# Patient Record
Sex: MALE | Race: WHITE
[De-identification: names, ages, dates, MRNs, and addresses within clinical notes are randomized per-mention and may not be internally consistent; named-entity substitution may affect disease eponyms.]

---

## 2018-08-22 ENCOUNTER — HOSPITAL ENCOUNTER (OUTPATIENT)
Dept: HOSPITAL 47 - LABWHC1 | Age: 38
Discharge: HOME | End: 2018-08-22
Payer: COMMERCIAL

## 2018-08-22 DIAGNOSIS — E11.65: Primary | ICD-10-CM

## 2018-08-22 PROCEDURE — 84681 ASSAY OF C-PEPTIDE: CPT

## 2018-08-22 PROCEDURE — 36415 COLL VENOUS BLD VENIPUNCTURE: CPT

## 2018-08-22 PROCEDURE — 82947 ASSAY GLUCOSE BLOOD QUANT: CPT

## 2018-09-17 ENCOUNTER — HOSPITAL ENCOUNTER (OUTPATIENT)
Dept: HOSPITAL 47 - RADECHMAIN | Age: 38
End: 2018-09-17
Attending: FAMILY MEDICINE
Payer: COMMERCIAL

## 2018-09-17 DIAGNOSIS — I08.1: Primary | ICD-10-CM

## 2018-09-17 DIAGNOSIS — R07.9: ICD-10-CM

## 2018-09-17 PROCEDURE — 93017 CV STRESS TEST TRACING ONLY: CPT

## 2018-09-17 PROCEDURE — 93306 TTE W/DOPPLER COMPLETE: CPT

## 2018-09-17 NOTE — EST
EXERCISE STRESS



AGE:

38



SEX:

M



HT:

6'



WT:

205



PROTOCOL:

Jonathan Stress Test



STAGE:

3



HEART RATE REST:

107



BLOOD PRESSURE REST:

108/73



MAXIMUM HEART RATE ACHIEVED:

126



MAXIMUM BLOOD PRESSURE:

201/81



85% MPHR:

154



100% MPHR:

181



METS:

12.3



CLINICAL INFORMATION:

Patient was exercised for a total period of 12 minutes.  A peak heart rate of 126 was

achieved. Maximum blood pressure of 201/81 mmHg was noted.  Patient did not complain of

any chest pain during the test.



Resting EKG shows a normal sinus rhythm with normal [place] interval and QRS duration and

normal ST-T waves.  No ST-segment depression suggestive of ischemia is noted.  No

dysrhythmias are noted.



FINAL IMPRESSION:

1. This exercise test is not suggestive of ischemia.

2. Patient's exercise tolerance is normal.

3. Patient did not complain of any chest pain during the test.





MMODL / IJN: [id number] / Job#: [id number]

## 2018-09-18 NOTE — ECHOF
Referral Reason:[id number] Dizziness



MEASUREMENTS

--------

HEIGHT: 182.9 cm

WEIGHT: 91.6 kg

BP: 

RVIDd:   2.8 cm     (< 3.3)

IVSd:   0.9 cm     (0.6 - 1.1)

LVIDd:   4.4 cm     (3.9 - 5.3)

LVPWd:   1.1 cm     (0.6 - 1.1)

IVSs:   1.3 cm

LVIDs:   3.4 cm

LVPWs:   1.2 cm

LA Diam:   2.5 cm     (2.7 - 3.8)

LAESV Index (A-L):   18.04 ml/m

Ao Diam:   3.6 cm     (2.0 - 3.7)

AV Cusp:   2.2 cm     (1.5 - 2.6)

LA Diam:   3.2 cm     (2.7 - 3.8)

MV EXCURSION:   21.866 mm     (> 18.000)

MV EF SLOPE:   122 mm/s     (70 - 150)

EPSS:   0.4 cm

MV E Maurice:   0.62 m/s

MV DecT:   207 ms

MV A Maurice:   0.71 m/s

MV E/A Ratio:   0.87 

RAP:   5.00 mmHg

RVSP:   20.18 mmHg







FINDINGS

--------

Sinus rhythm.

This was a technically good study.

LV size, wall thickness and systolic function are normal, with an EF greater than 55%.   The left mago
tricular size is normal.

The right ventricle is normal in size.

The left atrial size is normal.

The right atrial size is normal.

The aortic valve is trileaflet, and appears structurally normal. No aortic stenosis or regurgitation.


Mild mitral regurgitation is present.

Mild tricuspid regurgitation present.   There is no evidence of pulmonary hypertension.   The right v
entricular systolic pressure, as measured by Doppler, is 20.18mmHg.

Trace/mild (physiologic)  pulmonic regurgitation.

The aortic root size is normal.

There is no pericardial effusion.



CONCLUSIONS

--------

1. LV size, wall thickness and systolic function are normal, with an EF greater than 55%.

2. The left ventricular size is normal.

3. The right ventricle is normal in size.

4. The left atrial size is normal.

5. The right atrial size is normal.

6. The aortic valve is trileaflet, and appears structurally normal. No aortic stenosis or regurgitati
on.

7. Mild mitral regurgitation is present.

8. Mild tricuspid regurgitation present.

9. There is no evidence of pulmonary hypertension.

10. The right ventricular systolic pressure, as measured by Doppler, is 20.18mmHg.

11. Trace/mild (physiologic)  pulmonic regurgitation.

12. The aortic root size is normal.

13. There is no pericardial effusion.





SONOGRAPHER: Mindy Wilde RDCS

## 2018-12-17 ENCOUNTER — HOSPITAL ENCOUNTER (OUTPATIENT)
Dept: HOSPITAL 47 - LABWHC1 | Age: 38
Discharge: HOME | End: 2018-12-17
Payer: COMMERCIAL

## 2018-12-17 DIAGNOSIS — E10.65: Primary | ICD-10-CM

## 2018-12-17 LAB
ALBUMIN SERPL-MCNC: 4.4 G/DL (ref 3.8–4.9)
ALBUMIN/GLOB SERPL: 2.93 G/DL (ref 1.2–2.1)
ALP SERPL-CCNC: 65 U/L (ref 41–126)
ALT SERPL-CCNC: 36 U/L (ref 10–49)
ANION GAP SERPL CALC-SCNC: 6.5 MMOL/L (ref 4–12)
AST SERPL-CCNC: 28 U/L (ref 14–35)
BUN SERPL-SCNC: 12 MG/DL (ref 9–27)
CALCIUM SPEC-MCNC: 9.2 MG/DL (ref 8.7–10.3)
CHLORIDE SERPL-SCNC: 104 MMOL/L (ref 96–109)
CHOLEST SERPL-MCNC: 183 MG/DL (ref 0–200)
CO2 SERPL-SCNC: 27.5 MMOL/L (ref 21.6–31.8)
GLOBULIN SER CALC-MCNC: 1.5 G/DL (ref 2.1–3.7)
GLUCOSE SERPL-MCNC: 159 MG/DL (ref 70–110)
HBA1C MFR BLD: 8.8 % (ref 4–6)
HDLC SERPL-MCNC: 45 MG/DL (ref 40–60)
LDLC SERPL CALC-MCNC: 125.4 MG/DL (ref 0–131)
POTASSIUM SERPL-SCNC: 4.3 MMOL/L (ref 3.5–5.5)
PROT SERPL-MCNC: 5.9 G/DL (ref 6.2–8.2)
SODIUM SERPL-SCNC: 138 MMOL/L (ref 135–145)
TRIGL SERPL-MCNC: 63 MG/DL (ref 0–149)
VLDLC SERPL CALC-MCNC: 12.6 MG/DL (ref 5–40)

## 2018-12-17 PROCEDURE — 36415 COLL VENOUS BLD VENIPUNCTURE: CPT

## 2018-12-17 PROCEDURE — 80061 LIPID PANEL: CPT

## 2018-12-17 PROCEDURE — 83036 HEMOGLOBIN GLYCOSYLATED A1C: CPT

## 2018-12-17 PROCEDURE — 84443 ASSAY THYROID STIM HORMONE: CPT

## 2018-12-17 PROCEDURE — 80053 COMPREHEN METABOLIC PANEL: CPT

## 2019-01-23 ENCOUNTER — HOSPITAL ENCOUNTER (OUTPATIENT)
Dept: HOSPITAL 47 - RADXRMAIN | Age: 39
Discharge: HOME | End: 2019-01-23
Attending: FAMILY MEDICINE
Payer: COMMERCIAL

## 2019-01-23 DIAGNOSIS — M17.11: Primary | ICD-10-CM

## 2019-01-23 NOTE — XR
Right knee

 

HISTORY: Swelling and pain

 

3 views of the right knee

 

No evident joint effusion. Mild spurring present at the patellofemoral joint and medial compartment. 
Bone mineralization, joint spaces and alignment are maintained.

 

IMPRESSION: Mild osteoarthritis.

## 2021-09-01 ENCOUNTER — HOSPITAL ENCOUNTER (OUTPATIENT)
Dept: HOSPITAL 47 - RADXRMAIN | Age: 41
Discharge: HOME | End: 2021-09-01
Attending: FAMILY MEDICINE
Payer: COMMERCIAL

## 2021-09-01 DIAGNOSIS — S62.511A: Primary | ICD-10-CM

## 2021-09-01 NOTE — XR
Right thumb

 

HISTORY: Trauma and pain

 

4 views of the right thumb

 

Correlation to right hand dated 6/8/2010

 

At the volar aspect of the interphalangeal joint there is an ossific density present which was not se
en on previous exam measuring approximately 2 mm indeterminate. Alignment interphalangeal joint, meta
tarsophalangeal joint is maintained. There is some irregularity present at the proximal aspect of the
 proximal phalanx, displaced fracture fragment is present measuring 5 mm.

 

IMPRESSION: Gamekeeper's thumb, fracture as described.

## 2021-09-03 ENCOUNTER — HOSPITAL ENCOUNTER (OUTPATIENT)
Dept: HOSPITAL 47 - LABPAT | Age: 41
Discharge: HOME | End: 2021-09-03
Attending: ORTHOPAEDIC SURGERY
Payer: COMMERCIAL

## 2021-09-03 DIAGNOSIS — R94.31: Primary | ICD-10-CM

## 2021-09-03 LAB
ANION GAP SERPL CALC-SCNC: 7 MMOL/L
BASOPHILS # BLD AUTO: 0 K/UL (ref 0–0.2)
BASOPHILS NFR BLD AUTO: 1 %
CHLORIDE SERPL-SCNC: 100 MMOL/L (ref 98–107)
CO2 SERPL-SCNC: 27 MMOL/L (ref 22–30)
EOSINOPHIL # BLD AUTO: 0.1 K/UL (ref 0–0.7)
EOSINOPHIL NFR BLD AUTO: 3 %
ERYTHROCYTE [DISTWIDTH] IN BLOOD BY AUTOMATED COUNT: 4.98 M/UL (ref 4.3–5.9)
ERYTHROCYTE [DISTWIDTH] IN BLOOD: 12.9 % (ref 11.5–15.5)
GLUCOSE SERPL-MCNC: 378 MG/DL (ref 74–99)
HBA1C MFR BLD: 10.2 % (ref 4–6)
HCT VFR BLD AUTO: 47 % (ref 39–53)
HGB BLD-MCNC: 16 GM/DL (ref 13–17.5)
LYMPHOCYTES # SPEC AUTO: 1.4 K/UL (ref 1–4.8)
LYMPHOCYTES NFR SPEC AUTO: 32 %
MCH RBC QN AUTO: 32.1 PG (ref 25–35)
MCHC RBC AUTO-ENTMCNC: 34 G/DL (ref 31–37)
MCV RBC AUTO: 94.3 FL (ref 80–100)
MONOCYTES # BLD AUTO: 0.2 K/UL (ref 0–1)
MONOCYTES NFR BLD AUTO: 5 %
NEUTROPHILS # BLD AUTO: 2.6 K/UL (ref 1.3–7.7)
NEUTROPHILS NFR BLD AUTO: 58 %
PLATELET # BLD AUTO: 166 K/UL (ref 150–450)
POTASSIUM SERPL-SCNC: 4.3 MMOL/L (ref 3.5–5.1)
SODIUM SERPL-SCNC: 134 MMOL/L (ref 137–145)
WBC # BLD AUTO: 4.5 K/UL (ref 3.8–10.6)

## 2021-09-03 PROCEDURE — 82947 ASSAY GLUCOSE BLOOD QUANT: CPT

## 2021-09-03 PROCEDURE — 36415 COLL VENOUS BLD VENIPUNCTURE: CPT

## 2021-09-03 PROCEDURE — 80051 ELECTROLYTE PANEL: CPT

## 2021-09-03 PROCEDURE — 83036 HEMOGLOBIN GLYCOSYLATED A1C: CPT

## 2021-09-03 PROCEDURE — 85025 COMPLETE CBC W/AUTO DIFF WBC: CPT

## 2021-09-03 PROCEDURE — 93005 ELECTROCARDIOGRAM TRACING: CPT

## 2021-09-05 ENCOUNTER — HOSPITAL ENCOUNTER (OUTPATIENT)
Dept: HOSPITAL 47 - RADMRIMAIN | Age: 41
Discharge: HOME | End: 2021-09-05
Attending: ORTHOPAEDIC SURGERY
Payer: COMMERCIAL

## 2021-09-05 DIAGNOSIS — S62.511A: Primary | ICD-10-CM

## 2021-09-05 NOTE — MR
EXAMINATION TYPE: MR hand RT wo con

 

DATE OF EXAM: 9/5/2021

 

COMPARISON: None

 

HISTORY: Right thumb numbness and pain

 

Multiplanar multiecho imaging of the right hand without contrast.

 

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
. The second and third digits appear intact. I see no fracture nor dislocation. The stomach is intact
. IP joint spaces are fairly normal. I see no focal bone destruction. The intercarpal joint spaces ar
e fairly normal. There is no evidence of a soft tissue mass. There are small degenerative cysts in th
e distal scaphoid bone. There is some minimal soft tissue edema around the first metacarpal. The flex
or tendons of the first and second and third digits appear intact.

 

IMPRESSION:

Mild soft tissue edema around the first metacarpal. No fracture seen. No sign of osteomyelitis.

## 2022-06-17 ENCOUNTER — HOSPITAL ENCOUNTER (OUTPATIENT)
Dept: HOSPITAL 47 - LABWHC1 | Age: 42
Discharge: HOME | End: 2022-06-17
Attending: INTERNAL MEDICINE
Payer: COMMERCIAL

## 2022-06-17 DIAGNOSIS — E10.65: Primary | ICD-10-CM

## 2022-06-17 LAB
ALBUMIN SERPL-MCNC: 4.7 G/DL (ref 3.8–4.9)
ALBUMIN/GLOB SERPL: 2.04 G/DL (ref 1.6–3.17)
ALP SERPL-CCNC: 64 U/L (ref 41–126)
ALT SERPL-CCNC: 34 U/L (ref 10–49)
ANION GAP SERPL CALC-SCNC: 10.9 MMOL/L (ref 10–18)
AST SERPL-CCNC: 24 U/L (ref 14–35)
BUN SERPL-SCNC: 9.1 MG/DL (ref 9–27)
BUN/CREAT SERPL: 11.38 RATIO (ref 12–20)
CALCIUM SPEC-MCNC: 9.4 MG/DL (ref 8.7–10.3)
CHLORIDE SERPL-SCNC: 104 MMOL/L (ref 96–109)
CHOLEST SERPL-MCNC: 131 MG/DL (ref 0–200)
CO2 SERPL-SCNC: 24.1 MMOL/L (ref 20–27.5)
GLOBULIN SER CALC-MCNC: 2.3 G/DL (ref 1.6–3.3)
GLUCOSE SERPL-MCNC: 108 MG/DL (ref 70–110)
HDLC SERPL-MCNC: 39.3 MG/DL (ref 40–60)
LDLC SERPL CALC-MCNC: 79.7 MG/DL (ref 0–131)
POTASSIUM SERPL-SCNC: 3.7 MMOL/L (ref 3.5–5.5)
PROT SERPL-MCNC: 7 G/DL (ref 6.2–8.2)
SODIUM SERPL-SCNC: 139 MMOL/L (ref 135–145)
TRIGL SERPL-MCNC: 59.8 MG/DL (ref 0–149)
VLDLC SERPL CALC-MCNC: 11.96 MG/DL (ref 5–40)

## 2022-06-17 PROCEDURE — 36415 COLL VENOUS BLD VENIPUNCTURE: CPT

## 2022-06-17 PROCEDURE — 83036 HEMOGLOBIN GLYCOSYLATED A1C: CPT

## 2022-06-17 PROCEDURE — 80053 COMPREHEN METABOLIC PANEL: CPT

## 2022-06-17 PROCEDURE — 82570 ASSAY OF URINE CREATININE: CPT

## 2022-06-17 PROCEDURE — 84443 ASSAY THYROID STIM HORMONE: CPT

## 2022-06-17 PROCEDURE — 82043 UR ALBUMIN QUANTITATIVE: CPT

## 2022-06-17 PROCEDURE — 80061 LIPID PANEL: CPT

## 2022-11-18 ENCOUNTER — HOSPITAL ENCOUNTER (OUTPATIENT)
Dept: HOSPITAL 47 - LABWHC1 | Age: 42
Discharge: HOME | End: 2022-11-18
Attending: INTERNAL MEDICINE
Payer: COMMERCIAL

## 2022-11-18 DIAGNOSIS — E10.65: Primary | ICD-10-CM

## 2022-11-18 LAB
ALBUMIN SERPL-MCNC: 4.3 G/DL (ref 3.8–4.9)
ALBUMIN/GLOB SERPL: 1.95 G/DL (ref 1.6–3.17)
ALP SERPL-CCNC: 61 U/L (ref 41–126)
ALT SERPL-CCNC: 33 U/L (ref 10–49)
ANION GAP SERPL CALC-SCNC: 8.8 MMOL/L (ref 10–18)
AST SERPL-CCNC: 22 U/L (ref 14–35)
BUN SERPL-SCNC: 11.7 MG/DL (ref 9–27)
BUN/CREAT SERPL: 14.63 RATIO (ref 12–20)
CALCIUM SPEC-MCNC: 9.2 MG/DL (ref 8.7–10.3)
CHLORIDE SERPL-SCNC: 104 MMOL/L (ref 96–109)
CHOLEST SERPL-MCNC: 154 MG/DL (ref 0–200)
CO2 SERPL-SCNC: 24.2 MMOL/L (ref 20–27.5)
GLOBULIN SER CALC-MCNC: 2.2 G/DL (ref 1.6–3.3)
GLUCOSE SERPL-MCNC: 175 MG/DL (ref 70–110)
HDLC SERPL-MCNC: 37 MG/DL (ref 40–60)
LDLC SERPL CALC-MCNC: 95.4 MG/DL (ref 0–131)
POTASSIUM SERPL-SCNC: 4.4 MMOL/L (ref 3.5–5.5)
PROT SERPL-MCNC: 6.5 G/DL (ref 6.2–8.2)
SODIUM SERPL-SCNC: 137 MMOL/L (ref 135–145)
TRIGL SERPL-MCNC: 108 MG/DL (ref 0–149)
VLDLC SERPL CALC-MCNC: 21.6 MG/DL (ref 5–40)

## 2022-11-18 PROCEDURE — 80061 LIPID PANEL: CPT

## 2022-11-18 PROCEDURE — 84443 ASSAY THYROID STIM HORMONE: CPT

## 2022-11-18 PROCEDURE — 80053 COMPREHEN METABOLIC PANEL: CPT

## 2022-11-18 PROCEDURE — 83036 HEMOGLOBIN GLYCOSYLATED A1C: CPT

## 2022-11-18 PROCEDURE — 82043 UR ALBUMIN QUANTITATIVE: CPT

## 2022-11-18 PROCEDURE — 36415 COLL VENOUS BLD VENIPUNCTURE: CPT

## 2022-11-18 PROCEDURE — 82570 ASSAY OF URINE CREATININE: CPT

## 2024-09-26 ENCOUNTER — HOSPITAL ENCOUNTER (OUTPATIENT)
Dept: HOSPITAL 47 - LABWHC1 | Age: 44
Discharge: HOME | End: 2024-09-26
Attending: INTERNAL MEDICINE
Payer: COMMERCIAL

## 2024-09-26 DIAGNOSIS — E10.65: Primary | ICD-10-CM

## 2024-09-26 LAB
ALBUMIN SERPL-MCNC: 4.3 G/DL (ref 3.8–4.9)
ALBUMIN/GLOB SERPL: 2.05 RATIO (ref 1.6–3.17)
ALP SERPL-CCNC: 64 U/L (ref 41–126)
ALT SERPL-CCNC: 30 U/L (ref 10–49)
ANION GAP SERPL CALC-SCNC: 11.4 MMOL/L (ref 4–12)
AST SERPL-CCNC: 19 U/L (ref 14–35)
BUN SERPL-SCNC: 13.5 MG/DL (ref 9–27)
BUN/CREAT SERPL: 16.88 RATIO (ref 12–20)
CALCIUM SPEC-MCNC: 9 MG/DL (ref 8.7–10.3)
CHLORIDE SERPL-SCNC: 105 MMOL/L (ref 96–109)
CHOLEST SERPL-MCNC: 204 MG/DL (ref 0–200)
CO2 SERPL-SCNC: 23.6 MMOL/L (ref 21.6–31.8)
GLOBULIN SER CALC-MCNC: 2.1 G/DL (ref 1.6–3.3)
GLUCOSE SERPL-MCNC: 108 MG/DL (ref 70–110)
HDLC SERPL-MCNC: 41.1 MG/DL (ref 40–60)
LDLC SERPL CALC-MCNC: 149.3 MG/DL (ref 0–131)
POTASSIUM SERPL-SCNC: 4.3 MMOL/L (ref 3.5–5.5)
PROT SERPL-MCNC: 6.4 G/DL (ref 6.2–8.2)
SODIUM SERPL-SCNC: 140 MMOL/L (ref 135–145)
TRIGL SERPL-MCNC: 68.2 MG/DL (ref 0–149)
VLDLC SERPL CALC-MCNC: 13.64 MG/DL (ref 5–40)

## 2024-09-26 PROCEDURE — 36415 COLL VENOUS BLD VENIPUNCTURE: CPT

## 2024-09-26 PROCEDURE — 82043 UR ALBUMIN QUANTITATIVE: CPT

## 2024-09-26 PROCEDURE — 80061 LIPID PANEL: CPT

## 2024-09-26 PROCEDURE — 80053 COMPREHEN METABOLIC PANEL: CPT

## 2024-09-26 PROCEDURE — 82570 ASSAY OF URINE CREATININE: CPT

## 2024-09-26 PROCEDURE — 84443 ASSAY THYROID STIM HORMONE: CPT

## 2024-09-26 PROCEDURE — 83036 HEMOGLOBIN GLYCOSYLATED A1C: CPT

## 2025-05-09 ENCOUNTER — HOSPITAL ENCOUNTER (OUTPATIENT)
Dept: HOSPITAL 47 - LABWHC1 | Age: 45
Discharge: HOME | End: 2025-05-09
Attending: INTERNAL MEDICINE
Payer: COMMERCIAL

## 2025-05-09 DIAGNOSIS — E10.65: Primary | ICD-10-CM

## 2025-05-09 LAB
ALBUMIN SERPL-MCNC: 4.2 G/DL (ref 3.8–4.9)
ALBUMIN/GLOB SERPL: 1.91 RATIO (ref 1.6–3.17)
ALP SERPL-CCNC: 69 U/L (ref 41–126)
ALT SERPL-CCNC: 49 U/L (ref 10–49)
AST SERPL-CCNC: 49 U/L (ref 14–35)
BUN SERPL-SCNC: 17.4 MG/DL (ref 9–27)
BUN/CREAT SERPL: 21.75 RATIO (ref 12–20)
CALCIUM SPEC-MCNC: 8.9 MG/DL (ref 8.7–10.3)
CHLORIDE SERPL-SCNC: 106 MMOL/L (ref 96–109)
CO2 SERPL-SCNC: 24.3 MMOL/L (ref 21.6–31.8)
GLOBULIN SER CALC-MCNC: 2.2 G/DL (ref 1.6–3.3)
GLUCOSE SERPL-MCNC: 121 MG/DL (ref 70–110)
LDLC SERPL CALC-MCNC: 70.2 MG/DL (ref 0–131)
POTASSIUM SERPL-SCNC: 4.4 MMOL/L (ref 3.5–5.5)
PROT SERPL-MCNC: 6.4 G/DL (ref 6.2–8.2)
SODIUM SERPL-SCNC: 141 MMOL/L (ref 135–145)
VLDLC SERPL CALC-MCNC: 8.82 MG/DL (ref 5–40)

## 2025-05-09 PROCEDURE — 80061 LIPID PANEL: CPT

## 2025-05-09 PROCEDURE — 36415 COLL VENOUS BLD VENIPUNCTURE: CPT

## 2025-05-09 PROCEDURE — 84443 ASSAY THYROID STIM HORMONE: CPT

## 2025-05-09 PROCEDURE — 83036 HEMOGLOBIN GLYCOSYLATED A1C: CPT

## 2025-05-09 PROCEDURE — 80053 COMPREHEN METABOLIC PANEL: CPT

## 2025-05-16 ENCOUNTER — HOSPITAL ENCOUNTER (OUTPATIENT)
Dept: HOSPITAL 47 - LABWHC1 | Age: 45
Discharge: HOME | End: 2025-05-16
Attending: INTERNAL MEDICINE
Payer: COMMERCIAL

## 2025-05-16 DIAGNOSIS — E10.65: Primary | ICD-10-CM

## 2025-05-16 LAB — T4 FREE SERPL-MCNC: 0.98 NG/DL (ref 0.8–1.8)

## 2025-05-16 PROCEDURE — 84403 ASSAY OF TOTAL TESTOSTERONE: CPT

## 2025-05-16 PROCEDURE — 84305 ASSAY OF SOMATOMEDIN: CPT

## 2025-05-16 PROCEDURE — 84439 ASSAY OF FREE THYROXINE: CPT

## 2025-05-16 PROCEDURE — 82533 TOTAL CORTISOL: CPT

## 2025-05-16 PROCEDURE — 84443 ASSAY THYROID STIM HORMONE: CPT

## 2025-05-16 PROCEDURE — 36415 COLL VENOUS BLD VENIPUNCTURE: CPT
